# Patient Record
Sex: MALE | Race: WHITE | Employment: FULL TIME | URBAN - METROPOLITAN AREA
[De-identification: names, ages, dates, MRNs, and addresses within clinical notes are randomized per-mention and may not be internally consistent; named-entity substitution may affect disease eponyms.]

---

## 2021-08-31 ENCOUNTER — OFFICE VISIT (OUTPATIENT)
Dept: FAMILY MEDICINE CLINIC | Age: 56
End: 2021-08-31

## 2021-08-31 VITALS
BODY MASS INDEX: 18.58 KG/M2 | DIASTOLIC BLOOD PRESSURE: 79 MMHG | WEIGHT: 152.6 LBS | HEIGHT: 76 IN | TEMPERATURE: 98.1 F | HEART RATE: 57 BPM | OXYGEN SATURATION: 97 % | SYSTOLIC BLOOD PRESSURE: 122 MMHG

## 2021-08-31 DIAGNOSIS — Z00.00 ENCOUNTER FOR ROUTINE HISTORY AND PHYSICAL EXAMINATION: Primary | ICD-10-CM

## 2021-08-31 PROCEDURE — 99386 PREV VISIT NEW AGE 40-64: CPT | Performed by: NURSE PRACTITIONER

## 2021-08-31 RX ORDER — LANOLIN ALCOHOL/MO/W.PET/CERES
325 CREAM (GRAM) TOPICAL 2 TIMES DAILY WITH MEALS
COMMUNITY

## 2021-08-31 ASSESSMENT — ENCOUNTER SYMPTOMS
EYES NEGATIVE: 1
COUGH: 0
WHEEZING: 0
ALLERGIC/IMMUNOLOGIC NEGATIVE: 1
CHOKING: 0
GASTROINTESTINAL NEGATIVE: 1
CHEST TIGHTNESS: 0
SHORTNESS OF BREATH: 0

## 2021-08-31 ASSESSMENT — PATIENT HEALTH QUESTIONNAIRE - PHQ9
SUM OF ALL RESPONSES TO PHQ QUESTIONS 1-9: 0
1. LITTLE INTEREST OR PLEASURE IN DOING THINGS: 0
SUM OF ALL RESPONSES TO PHQ9 QUESTIONS 1 & 2: 0
2. FEELING DOWN, DEPRESSED OR HOPELESS: 0

## 2021-08-31 ASSESSMENT — VISUAL ACUITY
OS_CC: 20 25
OD_CC: 20 20

## 2021-08-31 NOTE — PROGRESS NOTES
on file     Social Determinants of Health     Financial Resource Strain:     Difficulty of Paying Living Expenses:    Food Insecurity:     Worried About Running Out of Food in the Last Year:     [address] in the Last Year:    Transportation Needs:     Lack of Transportation (Medical):  Lack of Transportation (Non-Medical):    Physical Activity:     Days of Exercise per Week:     Minutes of Exercise per Session:    Stress:     Feeling of Stress :    Social Connections:     Frequency of Communication with Friends and Family:     Frequency of Social Gatherings with Friends and Family:     Attends [employer] Services:     Active Member of Clubs or Organizations:     Attends Club or Organization Meetings:     Marital Status:    Intimate Partner Violence:     Fear of Current or Ex-Partner:     Emotionally Abused:     Physically Abused:     Sexually Abused:        Review of Systems   Constitutional: Negative for activity change, appetite change, chills, diaphoresis, fatigue, fever and unexpected weight change. HENT: Negative. Eyes: Negative. Respiratory: Negative for cough, choking, chest tightness, shortness of breath and wheezing. Cardiovascular: Negative for chest pain, palpitations and leg swelling. Gastrointestinal: Negative. Endocrine: Negative. Genitourinary: Negative. Musculoskeletal: Negative. Skin: Negative. Allergic/Immunologic: Negative. Neurological: Negative. Hematological: Negative. Psychiatric/Behavioral: Negative. OBJECTIVE:     /79 (Site: Right Upper Arm, Position: Sitting, Cuff Size: Medium Adult)   Pulse 57   Temp 98.1 °F (36.7 °C)   Ht 6' 4\" (1.93 m)   Wt 152 lb 9.6 oz (69.2 kg)   SpO2 97%   BMI 18.58 kg/m²     Physical Exam  Vitals reviewed. Constitutional:       General: He is not in acute distress. Appearance: Normal appearance. He is well-developed. He is not ill-appearing or diaphoretic.    HENT:      Head: Normocephalic and atraumatic. Right Ear: Tympanic membrane, ear canal and external ear normal. There is no impacted cerumen. Left Ear: Tympanic membrane, ear canal and external ear normal. There is no impacted cerumen. Nose: Nose normal. No congestion or rhinorrhea. Mouth/Throat:      Mouth: Mucous membranes are moist.      Pharynx: Oropharynx is clear. No oropharyngeal exudate or posterior oropharyngeal erythema. Eyes:      General: No scleral icterus. Right eye: No discharge. Left eye: No discharge. Extraocular Movements: Extraocular movements intact. Conjunctiva/sclera: Conjunctivae normal.      Pupils: Pupils are equal, round, and reactive to light. Cardiovascular:      Rate and Rhythm: Normal rate and regular rhythm. Heart sounds: Normal heart sounds. No murmur heard. No friction rub. No gallop. Pulmonary:      Effort: Pulmonary effort is normal. No respiratory distress. Breath sounds: Normal breath sounds. No wheezing. Chest:      Chest wall: No tenderness. Abdominal:      General: Abdomen is flat. Bowel sounds are normal. There is no distension. Palpations: Abdomen is soft. There is no mass. Tenderness: There is no abdominal tenderness. Hernia: No hernia is present. Musculoskeletal:         General: No swelling or tenderness. Normal range of motion. Cervical back: Normal range of motion and neck supple. No rigidity or tenderness. Right lower leg: No edema. Left lower leg: No edema. Lymphadenopathy:      Cervical: No cervical adenopathy. Skin:     General: Skin is warm and dry. Neurological:      General: No focal deficit present. Mental Status: He is alert and oriented to person, place, and time. Cranial Nerves: No cranial nerve deficit. Motor: No weakness.       Gait: Gait normal.      Deep Tendon Reflexes: Reflexes normal.   Psychiatric:         Behavior: Behavior normal.         Thought Content: Thought content normal.         Judgment: Judgment normal.         No results found for requested labs within last 30 days. No results found for: LABA1C, LABMICR, LDLCALC    No results found for: WBC, NEUTROABS, HGB, HCT, MCV, PLT, SEGSABS, LYMPHSABS, MONOSABS, EOSABS, BASOSABS  No results found for: TSH, TSHHS  No results found for: LABALBU, BILITOT, BILIDIR, IBILI, AST, ALT, ALKPHOS          No results found for this visit on 08/31/21. ASSESSMENT AND PLAN:     1. Encounter for routine history and physical examination  - well physical examination    Physical form completed, signed, copy retained for chart. No follow-ups on file. Care discussed with patient. Patient educated on signs and symptoms of exacerbation and when to seek further medical attention. Advised to call for any problems, questions, or concerns. Patient verbalizes understanding and agrees with plan. Medications reviewed and reconciled. Continue current medications. Appropriate prescriptions are ordered. Risks and benefits of meds are discussed. After visit summary provided.